# Patient Record
Sex: FEMALE | HISPANIC OR LATINO
[De-identification: names, ages, dates, MRNs, and addresses within clinical notes are randomized per-mention and may not be internally consistent; named-entity substitution may affect disease eponyms.]

---

## 2020-11-09 ENCOUNTER — APPOINTMENT (OUTPATIENT)
Dept: PEDIATRIC ALLERGY IMMUNOLOGY | Facility: CLINIC | Age: 55
End: 2020-11-09
Payer: MEDICARE

## 2020-11-09 VITALS
SYSTOLIC BLOOD PRESSURE: 120 MMHG | BODY MASS INDEX: 33.84 KG/M2 | HEIGHT: 63 IN | DIASTOLIC BLOOD PRESSURE: 70 MMHG | WEIGHT: 191 LBS

## 2020-11-09 PROCEDURE — 95004 PERQ TESTS W/ALRGNC XTRCS: CPT

## 2020-11-09 PROCEDURE — 95024 IQ TESTS W/ALLERGENIC XTRCS: CPT

## 2020-11-09 PROCEDURE — 99214 OFFICE O/P EST MOD 30 MIN: CPT | Mod: 25

## 2020-11-09 PROCEDURE — 99203 OFFICE O/P NEW LOW 30 MIN: CPT | Mod: 25

## 2020-11-09 RX ORDER — BUPROPION HYDROCHLORIDE 150 MG/1
150 TABLET, EXTENDED RELEASE ORAL
Refills: 0 | Status: ACTIVE | COMMUNITY

## 2020-11-09 RX ORDER — GABAPENTIN ENACARBIL 300 MG/1
300 TABLET, EXTENDED RELEASE ORAL
Refills: 0 | Status: ACTIVE | COMMUNITY

## 2020-11-09 RX ORDER — DICLOFENAC POTASSIUM 50 MG/1
50 POWDER, FOR SOLUTION ORAL
Refills: 0 | Status: ACTIVE | COMMUNITY

## 2020-11-09 RX ORDER — EPINEPHRINE 0.3 MG/.3ML
0.3 INJECTION INTRAMUSCULAR
Qty: 1 | Refills: 0 | Status: ACTIVE | COMMUNITY
Start: 2020-11-09 | End: 1900-01-01

## 2020-11-09 RX ORDER — CETIRIZINE HCL 10 MG
10 TABLET ORAL
Refills: 0 | Status: ACTIVE | COMMUNITY

## 2020-11-09 RX ORDER — ALBUTEROL SULFATE 90 UG/1
108 (90 BASE) AEROSOL, METERED RESPIRATORY (INHALATION)
Refills: 0 | Status: ACTIVE | COMMUNITY

## 2020-11-09 RX ORDER — CYCLOBENZAPRINE HYDROCHLORIDE 10 MG/1
10 TABLET, FILM COATED ORAL
Refills: 0 | Status: ACTIVE | COMMUNITY

## 2020-11-09 NOTE — SOCIAL HISTORY
[House] : [unfilled] lives in a house  [Radiator/Baseboard] : heating provided by radiator(s)/baseboard(s) [Window Units] : air conditioning provided by window units [Dry] : dry [Previous Pets ___] : previously owned pets [unfilled] [Single] : single [Smokers in Household] : there are smokers in the home [Dust Mite Covers] : does not have dust mite covers [Feather Pillows] : does not have feather pillows [Feather Comforter] : does not have a feather comforter [Bedroom] : not in the bedroom [Basement] : not in the basement [Living Area] : not in the living area

## 2020-11-09 NOTE — PHYSICAL EXAM
[Alert] : alert [Well Nourished] : well nourished [Healthy Appearance] : healthy appearance [No Acute Distress] : no acute distress [Well Developed] : well developed [Normal Pupil & Iris Size/Symmetry] : normal pupil and iris size and symmetry [No Discharge] : no discharge [No Photophobia] : no photophobia [Sclera Not Icteric] : sclera not icteric [Suborbital Bogginess] : suborbital bogginess (allergic shiners) [Normal TMs] : both tympanic membranes were normal [Normal Nasal Mucosa] : the nasal mucosa was normal [Normal Lips/Tongue] : the lips and tongue were normal [Normal Outer Ear/Nose] : the ears and nose were normal in appearance [Normal Tonsils] : normal tonsils [No Thrush] : no thrush [Normal Dentition] : normal dentition [No Oral Lesions or Ulcers] : no oral lesions or ulcers [Pale mucosa] : pale mucosa [Supple] : the neck was supple [Normal Rate and Effort] : normal respiratory rhythm and effort [Normal Palpation] : palpation of the chest revealed no abnormalities [No Crackles] : no crackles [No Retractions] : no retractions [Bilateral Audible Breath Sounds] : bilateral audible breath sounds [Normal Rate] : heart rate was normal  [Normal S1, S2] : normal S1 and S2 [No murmur] : no murmur [Regular Rhythm] : with a regular rhythm [Soft] : abdomen soft [Not Tender] : non-tender [Not Distended] : not distended [No HSM] : no hepato-splenomegaly [Normal Cervical Lymph Nodes] : cervical [Normal Axillary Lumph Nodes] : axillary [Skin Intact] : skin intact  [No Rash] : no rash [No Skin Lesions] : no skin lesions [No Joint Swelling or Erythema] : no joint swelling or erythema [No clubbing] : no clubbing [No Edema] : no edema [No Cyanosis] : no cyanosis [Normal Mood] : mood was normal [Normal Affect] : affect was normal [Alert, Awake, Oriented as Age-Appropriate] : alert, awake, oriented as age appropriate

## 2020-11-09 NOTE — ASSESSMENT
[FreeTextEntry1] : 1. AS - Albuterol PRN\par \par 2. AR/AC - Cetirizine PRN\par    Pt wishes to resume Immunotherapy. Will test and resume

## 2020-11-09 NOTE — HISTORY OF PRESENT ILLNESS
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.She has seasonal allergy symptoms and asthma. She is returning to continue immunotherapy treatment here.  [de-identified] : shellfish, raspberries, kiwi [Dyspnea on Exertion] : no dyspnea on exertion [Chest Pain] : no chest pain [Sputum Production] : non productive cough

## 2020-11-09 NOTE — REASON FOR VISIT
[Routine Follow-Up] : a routine follow-up visit for [Hay Fever] : hay fever [Itchy Eyes] : itchy eyes [Asthma] : asthma [Other: _____] : [unfilled]

## 2020-11-09 NOTE — IMPRESSION
[Allergy Testing Dust Mite] : dust mites [Allergy Testing Cockroach] : cockroach [Allergy Testing Dog] : dog [Allergy Testing Cat] : cat [] : molds [Allergy Testing Trees] : trees [Allergy Testing Weeds] : weeds [Allergy Testing Grasses] : grasses [FreeTextEntry3] : Weed, Ragweed, tree, DM, Mold

## 2020-11-17 ENCOUNTER — APPOINTMENT (OUTPATIENT)
Dept: PEDIATRIC ALLERGY IMMUNOLOGY | Facility: CLINIC | Age: 55
End: 2020-11-17

## 2020-11-30 ENCOUNTER — APPOINTMENT (OUTPATIENT)
Dept: PEDIATRIC ALLERGY IMMUNOLOGY | Facility: CLINIC | Age: 55
End: 2020-11-30
Payer: MEDICARE

## 2020-11-30 PROCEDURE — 95117 IMMUNOTHERAPY INJECTIONS: CPT

## 2020-12-08 ENCOUNTER — APPOINTMENT (OUTPATIENT)
Dept: PEDIATRIC ALLERGY IMMUNOLOGY | Facility: CLINIC | Age: 55
End: 2020-12-08
Payer: MEDICARE

## 2020-12-08 PROCEDURE — 95117 IMMUNOTHERAPY INJECTIONS: CPT

## 2020-12-15 ENCOUNTER — APPOINTMENT (OUTPATIENT)
Dept: PEDIATRIC ALLERGY IMMUNOLOGY | Facility: CLINIC | Age: 55
End: 2020-12-15
Payer: MEDICARE

## 2020-12-15 PROCEDURE — 95117 IMMUNOTHERAPY INJECTIONS: CPT

## 2020-12-22 ENCOUNTER — APPOINTMENT (OUTPATIENT)
Dept: PEDIATRIC ALLERGY IMMUNOLOGY | Facility: CLINIC | Age: 55
End: 2020-12-22
Payer: MEDICARE

## 2020-12-22 PROCEDURE — 95117 IMMUNOTHERAPY INJECTIONS: CPT

## 2020-12-29 ENCOUNTER — APPOINTMENT (OUTPATIENT)
Dept: PEDIATRIC ALLERGY IMMUNOLOGY | Facility: CLINIC | Age: 55
End: 2020-12-29
Payer: MEDICARE

## 2020-12-29 PROCEDURE — 95117 IMMUNOTHERAPY INJECTIONS: CPT

## 2021-01-05 ENCOUNTER — APPOINTMENT (OUTPATIENT)
Dept: PEDIATRIC ALLERGY IMMUNOLOGY | Facility: CLINIC | Age: 56
End: 2021-01-05
Payer: MEDICARE

## 2021-01-05 PROCEDURE — 95117 IMMUNOTHERAPY INJECTIONS: CPT

## 2021-01-14 ENCOUNTER — APPOINTMENT (OUTPATIENT)
Dept: PEDIATRIC ALLERGY IMMUNOLOGY | Facility: CLINIC | Age: 56
End: 2021-01-14
Payer: MEDICARE

## 2021-01-14 PROCEDURE — 95117 IMMUNOTHERAPY INJECTIONS: CPT

## 2021-01-26 ENCOUNTER — APPOINTMENT (OUTPATIENT)
Dept: PEDIATRIC ALLERGY IMMUNOLOGY | Facility: CLINIC | Age: 56
End: 2021-01-26
Payer: MEDICARE

## 2021-01-26 PROCEDURE — 95117 IMMUNOTHERAPY INJECTIONS: CPT

## 2021-02-02 ENCOUNTER — APPOINTMENT (OUTPATIENT)
Dept: PEDIATRIC ALLERGY IMMUNOLOGY | Facility: CLINIC | Age: 56
End: 2021-02-02

## 2021-02-04 ENCOUNTER — APPOINTMENT (OUTPATIENT)
Dept: PEDIATRIC ALLERGY IMMUNOLOGY | Facility: CLINIC | Age: 56
End: 2021-02-04
Payer: MEDICARE

## 2021-02-04 PROCEDURE — 95117 IMMUNOTHERAPY INJECTIONS: CPT

## 2021-02-11 ENCOUNTER — APPOINTMENT (OUTPATIENT)
Dept: PEDIATRIC ALLERGY IMMUNOLOGY | Facility: CLINIC | Age: 56
End: 2021-02-11
Payer: MEDICARE

## 2021-02-11 PROCEDURE — 95117 IMMUNOTHERAPY INJECTIONS: CPT

## 2021-02-16 ENCOUNTER — APPOINTMENT (OUTPATIENT)
Dept: PEDIATRIC ALLERGY IMMUNOLOGY | Facility: CLINIC | Age: 56
End: 2021-02-16

## 2021-02-16 ENCOUNTER — APPOINTMENT (OUTPATIENT)
Dept: PEDIATRIC ALLERGY IMMUNOLOGY | Facility: CLINIC | Age: 56
End: 2021-02-16
Payer: MEDICARE

## 2021-02-16 VITALS
SYSTOLIC BLOOD PRESSURE: 118 MMHG | BODY MASS INDEX: 33.31 KG/M2 | HEIGHT: 63 IN | TEMPERATURE: 97.1 F | DIASTOLIC BLOOD PRESSURE: 74 MMHG | WEIGHT: 188 LBS

## 2021-02-16 VITALS
DIASTOLIC BLOOD PRESSURE: 74 MMHG | WEIGHT: 188 LBS | BODY MASS INDEX: 33.31 KG/M2 | HEIGHT: 63 IN | TEMPERATURE: 97.1 F | SYSTOLIC BLOOD PRESSURE: 118 MMHG

## 2021-02-16 PROCEDURE — 95117 IMMUNOTHERAPY INJECTIONS: CPT

## 2021-02-16 PROCEDURE — 99213 OFFICE O/P EST LOW 20 MIN: CPT | Mod: 25

## 2021-02-16 NOTE — REVIEW OF SYSTEMS
[Chest Pain] : chest pain  or discomfort [Wheezing] : wheezing [Nl] : Genitourinary [Wheezing Worsens With Exercise] : wheezing does not worsen with exercise

## 2021-02-16 NOTE — REASON FOR VISIT
[Asthma] : asthma [Wheezing] : wheezing [Other: _____] : [unfilled] [FreeTextEntry2] : p [FreeTextEntry3] : asthma attacks twice this week

## 2021-02-16 NOTE — ASSESSMENT
[FreeTextEntry1] : 1. AS- montelukast, albuterol prn\par \par 2. AR/AC - Cetirizine prn\par \par 3. GERD - Pepcid prn

## 2021-02-16 NOTE — HISTORY OF PRESENT ILLNESS
[Shortness of Breath] : shortness of breath [Chest Pain] : chest pain [Wheezing] : wheezing  [de-identified] : TWICE THIS PAST WEEK [de-identified] : PAIN INJECTIONS [de-identified] : INHALER [de-identified] : TWICE THIS WEEK [de-identified] : CHEST TIGHTNESS AND WHEEZING [Cough] : no cough

## 2021-02-16 NOTE — HISTORY OF PRESENT ILLNESS
[Shortness of Breath] : shortness of breath [Chest Pain] : chest pain [Wheezing] : wheezing  [de-identified] : MORGAN BRUCE is a 55 year yo female w/ AS and AR/AC who had 2 episodes of bed time chest tightness 2 days apart She pain in the center of her chest. She did not take albuerol [de-identified] : twice this past week [de-identified] : pain injections and allergy injections the same day  [de-identified] : inhaler  [de-identified] : twice this past week  [de-identified] : chest tightness and wheezing

## 2021-02-23 ENCOUNTER — APPOINTMENT (OUTPATIENT)
Dept: PEDIATRIC ALLERGY IMMUNOLOGY | Facility: CLINIC | Age: 56
End: 2021-02-23
Payer: MEDICARE

## 2021-02-23 PROCEDURE — 95117 IMMUNOTHERAPY INJECTIONS: CPT

## 2021-03-02 ENCOUNTER — APPOINTMENT (OUTPATIENT)
Dept: PEDIATRIC ALLERGY IMMUNOLOGY | Facility: CLINIC | Age: 56
End: 2021-03-02
Payer: MEDICARE

## 2021-03-02 PROCEDURE — 95117 IMMUNOTHERAPY INJECTIONS: CPT

## 2021-03-09 ENCOUNTER — APPOINTMENT (OUTPATIENT)
Dept: PEDIATRIC ALLERGY IMMUNOLOGY | Facility: CLINIC | Age: 56
End: 2021-03-09
Payer: MEDICARE

## 2021-03-09 PROCEDURE — 95117 IMMUNOTHERAPY INJECTIONS: CPT

## 2021-03-16 ENCOUNTER — APPOINTMENT (OUTPATIENT)
Dept: PEDIATRIC ALLERGY IMMUNOLOGY | Facility: CLINIC | Age: 56
End: 2021-03-16
Payer: MEDICARE

## 2021-03-16 PROCEDURE — 95117 IMMUNOTHERAPY INJECTIONS: CPT

## 2021-03-23 ENCOUNTER — APPOINTMENT (OUTPATIENT)
Dept: PEDIATRIC ALLERGY IMMUNOLOGY | Facility: CLINIC | Age: 56
End: 2021-03-23
Payer: MEDICARE

## 2021-03-23 PROCEDURE — 95117 IMMUNOTHERAPY INJECTIONS: CPT

## 2021-03-30 ENCOUNTER — APPOINTMENT (OUTPATIENT)
Dept: PEDIATRIC ALLERGY IMMUNOLOGY | Facility: CLINIC | Age: 56
End: 2021-03-30
Payer: MEDICARE

## 2021-03-30 PROCEDURE — 95117 IMMUNOTHERAPY INJECTIONS: CPT

## 2021-04-13 ENCOUNTER — APPOINTMENT (OUTPATIENT)
Dept: PEDIATRIC ALLERGY IMMUNOLOGY | Facility: CLINIC | Age: 56
End: 2021-04-13
Payer: MEDICARE

## 2021-04-13 PROCEDURE — 95117 IMMUNOTHERAPY INJECTIONS: CPT

## 2021-05-11 ENCOUNTER — APPOINTMENT (OUTPATIENT)
Dept: PEDIATRIC ALLERGY IMMUNOLOGY | Facility: CLINIC | Age: 56
End: 2021-05-11
Payer: MEDICARE

## 2021-05-11 PROCEDURE — 95117 IMMUNOTHERAPY INJECTIONS: CPT

## 2021-05-18 ENCOUNTER — APPOINTMENT (OUTPATIENT)
Dept: PEDIATRIC ALLERGY IMMUNOLOGY | Facility: CLINIC | Age: 56
End: 2021-05-18
Payer: MEDICARE

## 2021-05-18 PROCEDURE — 95117 IMMUNOTHERAPY INJECTIONS: CPT

## 2021-05-27 ENCOUNTER — APPOINTMENT (OUTPATIENT)
Dept: PEDIATRIC ALLERGY IMMUNOLOGY | Facility: CLINIC | Age: 56
End: 2021-05-27
Payer: MEDICARE

## 2021-05-27 PROCEDURE — 95117 IMMUNOTHERAPY INJECTIONS: CPT

## 2021-06-10 ENCOUNTER — APPOINTMENT (OUTPATIENT)
Dept: PEDIATRIC ALLERGY IMMUNOLOGY | Facility: CLINIC | Age: 56
End: 2021-06-10
Payer: MEDICARE

## 2021-06-10 PROCEDURE — 95117 IMMUNOTHERAPY INJECTIONS: CPT

## 2021-06-17 ENCOUNTER — APPOINTMENT (OUTPATIENT)
Dept: PEDIATRIC ALLERGY IMMUNOLOGY | Facility: CLINIC | Age: 56
End: 2021-06-17
Payer: MEDICARE

## 2021-06-17 PROCEDURE — 95117 IMMUNOTHERAPY INJECTIONS: CPT

## 2021-06-22 ENCOUNTER — APPOINTMENT (OUTPATIENT)
Dept: PEDIATRIC ALLERGY IMMUNOLOGY | Facility: CLINIC | Age: 56
End: 2021-06-22
Payer: MEDICARE

## 2021-06-22 PROCEDURE — 95117 IMMUNOTHERAPY INJECTIONS: CPT

## 2021-06-25 ENCOUNTER — APPOINTMENT (OUTPATIENT)
Dept: PEDIATRIC ALLERGY IMMUNOLOGY | Facility: CLINIC | Age: 56
End: 2021-06-25
Payer: MEDICARE

## 2021-06-25 PROCEDURE — 95165 ANTIGEN THERAPY SERVICES: CPT

## 2021-07-01 ENCOUNTER — APPOINTMENT (OUTPATIENT)
Dept: PEDIATRIC ALLERGY IMMUNOLOGY | Facility: CLINIC | Age: 56
End: 2021-07-01
Payer: MEDICARE

## 2021-07-01 PROCEDURE — 95117 IMMUNOTHERAPY INJECTIONS: CPT

## 2021-07-08 ENCOUNTER — APPOINTMENT (OUTPATIENT)
Dept: PEDIATRIC ALLERGY IMMUNOLOGY | Facility: CLINIC | Age: 56
End: 2021-07-08
Payer: MEDICARE

## 2021-07-08 PROCEDURE — 95117 IMMUNOTHERAPY INJECTIONS: CPT

## 2021-07-15 ENCOUNTER — APPOINTMENT (OUTPATIENT)
Dept: PEDIATRIC ALLERGY IMMUNOLOGY | Facility: CLINIC | Age: 56
End: 2021-07-15
Payer: MEDICARE

## 2021-07-15 PROCEDURE — 95117 IMMUNOTHERAPY INJECTIONS: CPT

## 2021-08-24 ENCOUNTER — APPOINTMENT (OUTPATIENT)
Dept: PEDIATRIC ALLERGY IMMUNOLOGY | Facility: CLINIC | Age: 56
End: 2021-08-24
Payer: MEDICARE

## 2021-08-24 PROCEDURE — 95117 IMMUNOTHERAPY INJECTIONS: CPT

## 2021-09-02 ENCOUNTER — APPOINTMENT (OUTPATIENT)
Dept: PEDIATRIC ALLERGY IMMUNOLOGY | Facility: CLINIC | Age: 56
End: 2021-09-02
Payer: MEDICARE

## 2021-09-02 PROCEDURE — 95117 IMMUNOTHERAPY INJECTIONS: CPT

## 2021-09-02 RX ORDER — AZELASTINE HYDROCHLORIDE 0.5 MG/ML
0.05 SOLUTION/ DROPS OPHTHALMIC TWICE DAILY
Qty: 3 | Refills: 0 | Status: ACTIVE | COMMUNITY
Start: 2021-09-02 | End: 1900-01-01

## 2021-09-03 ENCOUNTER — APPOINTMENT (OUTPATIENT)
Dept: PEDIATRIC ALLERGY IMMUNOLOGY | Facility: CLINIC | Age: 56
End: 2021-09-03
Payer: MEDICARE

## 2021-09-03 PROCEDURE — 95165 ANTIGEN THERAPY SERVICES: CPT

## 2021-09-07 ENCOUNTER — APPOINTMENT (OUTPATIENT)
Dept: PEDIATRIC ALLERGY IMMUNOLOGY | Facility: CLINIC | Age: 56
End: 2021-09-07
Payer: MEDICARE

## 2021-09-07 PROCEDURE — 95117 IMMUNOTHERAPY INJECTIONS: CPT

## 2021-09-16 ENCOUNTER — APPOINTMENT (OUTPATIENT)
Dept: PEDIATRIC ALLERGY IMMUNOLOGY | Facility: CLINIC | Age: 56
End: 2021-09-16
Payer: MEDICARE

## 2021-09-16 PROCEDURE — 95117 IMMUNOTHERAPY INJECTIONS: CPT

## 2021-09-21 ENCOUNTER — APPOINTMENT (OUTPATIENT)
Dept: PEDIATRIC ALLERGY IMMUNOLOGY | Facility: CLINIC | Age: 56
End: 2021-09-21
Payer: MEDICARE

## 2021-09-21 PROCEDURE — 95117 IMMUNOTHERAPY INJECTIONS: CPT

## 2021-09-30 ENCOUNTER — APPOINTMENT (OUTPATIENT)
Dept: PEDIATRIC ALLERGY IMMUNOLOGY | Facility: CLINIC | Age: 56
End: 2021-09-30
Payer: MEDICARE

## 2021-09-30 PROCEDURE — 95117 IMMUNOTHERAPY INJECTIONS: CPT

## 2021-10-12 ENCOUNTER — APPOINTMENT (OUTPATIENT)
Dept: PEDIATRIC ALLERGY IMMUNOLOGY | Facility: CLINIC | Age: 56
End: 2021-10-12
Payer: MEDICARE

## 2021-10-12 PROCEDURE — 95117 IMMUNOTHERAPY INJECTIONS: CPT

## 2021-10-21 ENCOUNTER — APPOINTMENT (OUTPATIENT)
Dept: PEDIATRIC ALLERGY IMMUNOLOGY | Facility: CLINIC | Age: 56
End: 2021-10-21
Payer: MEDICARE

## 2021-10-21 PROCEDURE — 95117 IMMUNOTHERAPY INJECTIONS: CPT

## 2021-10-28 ENCOUNTER — APPOINTMENT (OUTPATIENT)
Dept: PEDIATRIC ALLERGY IMMUNOLOGY | Facility: CLINIC | Age: 56
End: 2021-10-28
Payer: MEDICARE

## 2021-10-28 PROCEDURE — 95117 IMMUNOTHERAPY INJECTIONS: CPT

## 2021-11-04 ENCOUNTER — APPOINTMENT (OUTPATIENT)
Dept: PEDIATRIC ALLERGY IMMUNOLOGY | Facility: CLINIC | Age: 56
End: 2021-11-04
Payer: MEDICARE

## 2021-11-04 PROCEDURE — 95117 IMMUNOTHERAPY INJECTIONS: CPT

## 2021-11-12 ENCOUNTER — APPOINTMENT (OUTPATIENT)
Dept: PEDIATRIC ALLERGY IMMUNOLOGY | Facility: CLINIC | Age: 56
End: 2021-11-12
Payer: MEDICARE

## 2021-11-12 PROCEDURE — 95165 ANTIGEN THERAPY SERVICES: CPT

## 2021-11-16 ENCOUNTER — APPOINTMENT (OUTPATIENT)
Dept: PEDIATRIC ALLERGY IMMUNOLOGY | Facility: CLINIC | Age: 56
End: 2021-11-16
Payer: MEDICARE

## 2021-11-16 PROCEDURE — 95117 IMMUNOTHERAPY INJECTIONS: CPT

## 2021-11-23 ENCOUNTER — APPOINTMENT (OUTPATIENT)
Dept: PEDIATRIC ALLERGY IMMUNOLOGY | Facility: CLINIC | Age: 56
End: 2021-11-23
Payer: MEDICARE

## 2021-11-23 PROCEDURE — 95117 IMMUNOTHERAPY INJECTIONS: CPT

## 2021-11-29 ENCOUNTER — APPOINTMENT (OUTPATIENT)
Dept: PEDIATRIC ALLERGY IMMUNOLOGY | Facility: CLINIC | Age: 56
End: 2021-11-29
Payer: MEDICARE

## 2021-11-29 PROCEDURE — 95117 IMMUNOTHERAPY INJECTIONS: CPT

## 2021-11-29 RX ORDER — GENTAMICIN SULFATE 3 MG/ML
0.3 SOLUTION OPHTHALMIC
Qty: 1 | Refills: 1 | Status: ACTIVE | COMMUNITY
Start: 2021-11-29 | End: 1900-01-01

## 2021-12-09 ENCOUNTER — APPOINTMENT (OUTPATIENT)
Dept: PEDIATRIC ALLERGY IMMUNOLOGY | Facility: CLINIC | Age: 56
End: 2021-12-09
Payer: MEDICARE

## 2021-12-09 PROCEDURE — 95117 IMMUNOTHERAPY INJECTIONS: CPT

## 2021-12-14 ENCOUNTER — APPOINTMENT (OUTPATIENT)
Dept: PEDIATRIC ALLERGY IMMUNOLOGY | Facility: CLINIC | Age: 56
End: 2021-12-14
Payer: MEDICARE

## 2021-12-14 PROCEDURE — 95117 IMMUNOTHERAPY INJECTIONS: CPT

## 2022-01-04 ENCOUNTER — APPOINTMENT (OUTPATIENT)
Dept: PEDIATRIC ALLERGY IMMUNOLOGY | Facility: CLINIC | Age: 57
End: 2022-01-04
Payer: MEDICARE

## 2022-01-04 PROCEDURE — 95117 IMMUNOTHERAPY INJECTIONS: CPT

## 2022-01-04 RX ORDER — FLUTICASONE PROPIONATE 50 UG/1
50 SPRAY, METERED NASAL DAILY
Qty: 1 | Refills: 2 | Status: ACTIVE | COMMUNITY
Start: 2022-01-04 | End: 1900-01-01

## 2022-01-10 ENCOUNTER — APPOINTMENT (OUTPATIENT)
Dept: PEDIATRIC ALLERGY IMMUNOLOGY | Facility: CLINIC | Age: 57
End: 2022-01-10
Payer: MEDICARE

## 2022-01-10 PROCEDURE — 95117 IMMUNOTHERAPY INJECTIONS: CPT

## 2022-01-18 ENCOUNTER — APPOINTMENT (OUTPATIENT)
Dept: PEDIATRIC ALLERGY IMMUNOLOGY | Facility: CLINIC | Age: 57
End: 2022-01-18
Payer: MEDICARE

## 2022-01-18 PROCEDURE — 95117 IMMUNOTHERAPY INJECTIONS: CPT

## 2022-01-21 ENCOUNTER — APPOINTMENT (OUTPATIENT)
Dept: PEDIATRIC ALLERGY IMMUNOLOGY | Facility: CLINIC | Age: 57
End: 2022-01-21
Payer: MEDICARE

## 2022-01-21 PROCEDURE — 95165 ANTIGEN THERAPY SERVICES: CPT

## 2022-01-25 ENCOUNTER — APPOINTMENT (OUTPATIENT)
Dept: PEDIATRIC ALLERGY IMMUNOLOGY | Facility: CLINIC | Age: 57
End: 2022-01-25
Payer: MEDICARE

## 2022-01-25 PROCEDURE — 95117 IMMUNOTHERAPY INJECTIONS: CPT

## 2022-02-03 ENCOUNTER — APPOINTMENT (OUTPATIENT)
Dept: PEDIATRIC ALLERGY IMMUNOLOGY | Facility: CLINIC | Age: 57
End: 2022-02-03
Payer: MEDICARE

## 2022-02-03 PROCEDURE — 95117 IMMUNOTHERAPY INJECTIONS: CPT

## 2022-02-10 ENCOUNTER — APPOINTMENT (OUTPATIENT)
Dept: PEDIATRIC ALLERGY IMMUNOLOGY | Facility: CLINIC | Age: 57
End: 2022-02-10
Payer: MEDICARE

## 2022-02-10 PROCEDURE — 95117 IMMUNOTHERAPY INJECTIONS: CPT

## 2022-02-15 ENCOUNTER — APPOINTMENT (OUTPATIENT)
Dept: PEDIATRIC ALLERGY IMMUNOLOGY | Facility: CLINIC | Age: 57
End: 2022-02-15
Payer: MEDICARE

## 2022-02-15 PROCEDURE — 95117 IMMUNOTHERAPY INJECTIONS: CPT

## 2022-02-22 ENCOUNTER — APPOINTMENT (OUTPATIENT)
Dept: PEDIATRIC ALLERGY IMMUNOLOGY | Facility: CLINIC | Age: 57
End: 2022-02-22
Payer: MEDICARE

## 2022-02-22 VITALS
DIASTOLIC BLOOD PRESSURE: 74 MMHG | SYSTOLIC BLOOD PRESSURE: 128 MMHG | WEIGHT: 188 LBS | TEMPERATURE: 97.9 F | BODY MASS INDEX: 33.31 KG/M2 | HEIGHT: 63 IN

## 2022-02-22 DIAGNOSIS — J01.90 ACUTE SINUSITIS, UNSPECIFIED: ICD-10-CM

## 2022-02-22 PROCEDURE — 99213 OFFICE O/P EST LOW 20 MIN: CPT

## 2022-02-22 RX ORDER — METHYLPREDNISOLONE 4 MG/1
4 TABLET ORAL
Qty: 1 | Refills: 0 | Status: ACTIVE | COMMUNITY
Start: 2022-02-22 | End: 1900-01-01

## 2022-02-22 NOTE — HISTORY OF PRESENT ILLNESS
[de-identified] : MORGAN BRUCE is a 56 year female  with a history of nasal congestion, hard to breath and sinus headache that has been going on since last week. Pt states it has been getting worse in the night time and is now affecting her sleep. She was taking nasal decongestant and Flonase with no improvement. She denies any fever or cough,

## 2022-02-22 NOTE — ASSESSMENT
[FreeTextEntry1] : 1. AS- montelukast, albuterol prn\par \par 2. AR/AC - Cetirizine prn\par \par 3. GERD - Pepcid prn\par \par 4. Sinusitis - Methylprednisolone taper

## 2022-02-22 NOTE — PHYSICAL EXAM
[Alert] : alert [Well Nourished] : well nourished [Healthy Appearance] : healthy appearance [No Acute Distress] : no acute distress [Well Developed] : well developed [Normal Pupil & Iris Size/Symmetry] : normal pupil and iris size and symmetry [No Discharge] : no discharge [No Photophobia] : no photophobia [Sclera Not Icteric] : sclera not icteric [Normal TMs] : both tympanic membranes were normal [Normal Nasal Mucosa] : the nasal mucosa was normal [Normal Lips/Tongue] : the lips and tongue were normal [Normal Outer Ear/Nose] : the ears and nose were normal in appearance [Normal Tonsils] : normal tonsils [No Thrush] : no thrush [Pale mucosa] : no pale mucosa [Supple] : the neck was supple [Normal Rate and Effort] : normal respiratory rhythm and effort [No Crackles] : no crackles [No Retractions] : no retractions [Bilateral Audible Breath Sounds] : bilateral audible breath sounds [Normal Rate] : heart rate was normal  [Normal S1, S2] : normal S1 and S2 [No murmur] : no murmur [Regular Rhythm] : with a regular rhythm [Skin Intact] : skin intact  [No Rash] : no rash [No Skin Lesions] : no skin lesions [No clubbing] : no clubbing [No Edema] : no edema [No Cyanosis] : no cyanosis [Normal Mood] : mood was normal [Normal Affect] : affect was normal [Alert, Awake, Oriented as Age-Appropriate] : alert, awake, oriented as age appropriate

## 2022-02-22 NOTE — REVIEW OF SYSTEMS
[Nasal Congestion] : nasal congestion [Chest Pain] : chest pain  or discomfort [Difficulty Breathing] : dyspnea [Wheezing] : wheezing [Sleep Disturbances] : ~T sleep disturbances [Nl] : Genitourinary

## 2022-03-03 ENCOUNTER — APPOINTMENT (OUTPATIENT)
Dept: PEDIATRIC ALLERGY IMMUNOLOGY | Facility: CLINIC | Age: 57
End: 2022-03-03
Payer: MEDICARE

## 2022-03-03 PROCEDURE — 95117 IMMUNOTHERAPY INJECTIONS: CPT

## 2022-03-04 RX ORDER — ALBUTEROL SULFATE 90 UG/1
108 (90 BASE) INHALANT RESPIRATORY (INHALATION)
Qty: 1 | Refills: 0 | Status: ACTIVE | COMMUNITY
Start: 2022-03-04 | End: 1900-01-01

## 2022-03-07 ENCOUNTER — APPOINTMENT (OUTPATIENT)
Dept: PEDIATRIC ALLERGY IMMUNOLOGY | Facility: CLINIC | Age: 57
End: 2022-03-07
Payer: MEDICARE

## 2022-03-07 PROCEDURE — 95117 IMMUNOTHERAPY INJECTIONS: CPT

## 2022-03-07 RX ORDER — FEXOFENADINE HYDROCHLORIDE 180 MG/1
180 TABLET ORAL
Qty: 30 | Refills: 1 | Status: ACTIVE | COMMUNITY
Start: 2022-03-07 | End: 1900-01-01

## 2022-03-17 ENCOUNTER — APPOINTMENT (OUTPATIENT)
Dept: PEDIATRIC ALLERGY IMMUNOLOGY | Facility: CLINIC | Age: 57
End: 2022-03-17
Payer: MEDICARE

## 2022-03-17 PROCEDURE — 95117 IMMUNOTHERAPY INJECTIONS: CPT

## 2022-03-21 ENCOUNTER — APPOINTMENT (OUTPATIENT)
Dept: PEDIATRIC ALLERGY IMMUNOLOGY | Facility: CLINIC | Age: 57
End: 2022-03-21
Payer: MEDICARE

## 2022-03-21 PROCEDURE — 95117 IMMUNOTHERAPY INJECTIONS: CPT

## 2022-03-28 ENCOUNTER — APPOINTMENT (OUTPATIENT)
Dept: PEDIATRIC ALLERGY IMMUNOLOGY | Facility: CLINIC | Age: 57
End: 2022-03-28
Payer: MEDICARE

## 2022-03-28 PROCEDURE — 95117 IMMUNOTHERAPY INJECTIONS: CPT

## 2022-04-01 ENCOUNTER — APPOINTMENT (OUTPATIENT)
Dept: PEDIATRIC ALLERGY IMMUNOLOGY | Facility: CLINIC | Age: 57
End: 2022-04-01
Payer: MEDICARE

## 2022-04-01 PROCEDURE — 95165 ANTIGEN THERAPY SERVICES: CPT

## 2022-04-07 ENCOUNTER — APPOINTMENT (OUTPATIENT)
Dept: PEDIATRIC ALLERGY IMMUNOLOGY | Facility: CLINIC | Age: 57
End: 2022-04-07
Payer: MEDICARE

## 2022-04-07 PROCEDURE — 95117 IMMUNOTHERAPY INJECTIONS: CPT

## 2022-04-11 ENCOUNTER — APPOINTMENT (OUTPATIENT)
Dept: PEDIATRIC ALLERGY IMMUNOLOGY | Facility: CLINIC | Age: 57
End: 2022-04-11
Payer: MEDICARE

## 2022-04-11 PROCEDURE — 95117 IMMUNOTHERAPY INJECTIONS: CPT

## 2022-04-14 ENCOUNTER — RX RENEWAL (OUTPATIENT)
Age: 57
End: 2022-04-14

## 2022-04-14 RX ORDER — EPINEPHRINE 0.3 MG/.3ML
0.3 INJECTION INTRAMUSCULAR
Qty: 2 | Refills: 0 | Status: ACTIVE | COMMUNITY
Start: 2022-03-04 | End: 1900-01-01

## 2022-04-21 ENCOUNTER — APPOINTMENT (OUTPATIENT)
Dept: PEDIATRIC ALLERGY IMMUNOLOGY | Facility: CLINIC | Age: 57
End: 2022-04-21
Payer: MEDICARE

## 2022-04-21 PROCEDURE — 95117 IMMUNOTHERAPY INJECTIONS: CPT

## 2022-04-26 ENCOUNTER — APPOINTMENT (OUTPATIENT)
Dept: PEDIATRIC ALLERGY IMMUNOLOGY | Facility: CLINIC | Age: 57
End: 2022-04-26
Payer: MEDICARE

## 2022-04-26 PROCEDURE — 95117 IMMUNOTHERAPY INJECTIONS: CPT

## 2022-05-05 ENCOUNTER — APPOINTMENT (OUTPATIENT)
Dept: PEDIATRIC ALLERGY IMMUNOLOGY | Facility: CLINIC | Age: 57
End: 2022-05-05
Payer: MEDICARE

## 2022-05-05 PROCEDURE — 95117 IMMUNOTHERAPY INJECTIONS: CPT

## 2022-05-10 ENCOUNTER — APPOINTMENT (OUTPATIENT)
Dept: PEDIATRIC ALLERGY IMMUNOLOGY | Facility: CLINIC | Age: 57
End: 2022-05-10
Payer: MEDICARE

## 2022-05-10 PROCEDURE — 95117 IMMUNOTHERAPY INJECTIONS: CPT

## 2022-05-19 ENCOUNTER — APPOINTMENT (OUTPATIENT)
Dept: PEDIATRIC ALLERGY IMMUNOLOGY | Facility: CLINIC | Age: 57
End: 2022-05-19
Payer: MEDICARE

## 2022-05-19 PROCEDURE — 95117 IMMUNOTHERAPY INJECTIONS: CPT

## 2022-05-24 ENCOUNTER — APPOINTMENT (OUTPATIENT)
Dept: PEDIATRIC ALLERGY IMMUNOLOGY | Facility: CLINIC | Age: 57
End: 2022-05-24
Payer: MEDICARE

## 2022-05-24 PROCEDURE — 95117 IMMUNOTHERAPY INJECTIONS: CPT

## 2022-05-31 ENCOUNTER — APPOINTMENT (OUTPATIENT)
Dept: PEDIATRIC ALLERGY IMMUNOLOGY | Facility: CLINIC | Age: 57
End: 2022-05-31
Payer: MEDICARE

## 2022-05-31 PROCEDURE — 95117 IMMUNOTHERAPY INJECTIONS: CPT

## 2022-06-07 ENCOUNTER — APPOINTMENT (OUTPATIENT)
Dept: PEDIATRIC ALLERGY IMMUNOLOGY | Facility: CLINIC | Age: 57
End: 2022-06-07
Payer: MEDICARE

## 2022-06-07 PROCEDURE — 95117 IMMUNOTHERAPY INJECTIONS: CPT

## 2022-06-10 ENCOUNTER — APPOINTMENT (OUTPATIENT)
Dept: PEDIATRIC ALLERGY IMMUNOLOGY | Facility: CLINIC | Age: 57
End: 2022-06-10
Payer: MEDICARE

## 2022-06-10 PROCEDURE — 95165 ANTIGEN THERAPY SERVICES: CPT

## 2022-06-13 ENCOUNTER — APPOINTMENT (OUTPATIENT)
Dept: PEDIATRIC ALLERGY IMMUNOLOGY | Facility: CLINIC | Age: 57
End: 2022-06-13
Payer: MEDICARE

## 2022-06-13 PROCEDURE — 95117 IMMUNOTHERAPY INJECTIONS: CPT

## 2022-06-21 ENCOUNTER — APPOINTMENT (OUTPATIENT)
Dept: PEDIATRIC ALLERGY IMMUNOLOGY | Facility: CLINIC | Age: 57
End: 2022-06-21
Payer: MEDICARE

## 2022-06-21 PROCEDURE — 95117 IMMUNOTHERAPY INJECTIONS: CPT

## 2022-06-28 ENCOUNTER — APPOINTMENT (OUTPATIENT)
Dept: PEDIATRIC ALLERGY IMMUNOLOGY | Facility: CLINIC | Age: 57
End: 2022-06-28

## 2022-06-28 PROCEDURE — 95117 IMMUNOTHERAPY INJECTIONS: CPT

## 2022-07-05 ENCOUNTER — APPOINTMENT (OUTPATIENT)
Dept: PEDIATRIC ALLERGY IMMUNOLOGY | Facility: CLINIC | Age: 57
End: 2022-07-05

## 2022-07-05 PROCEDURE — 95117 IMMUNOTHERAPY INJECTIONS: CPT

## 2022-07-14 ENCOUNTER — APPOINTMENT (OUTPATIENT)
Dept: PEDIATRIC ALLERGY IMMUNOLOGY | Facility: CLINIC | Age: 57
End: 2022-07-14

## 2022-07-14 PROCEDURE — 95117 IMMUNOTHERAPY INJECTIONS: CPT

## 2022-07-19 ENCOUNTER — APPOINTMENT (OUTPATIENT)
Dept: PEDIATRIC ALLERGY IMMUNOLOGY | Facility: CLINIC | Age: 57
End: 2022-07-19

## 2022-07-19 PROCEDURE — 95117 IMMUNOTHERAPY INJECTIONS: CPT

## 2022-07-26 ENCOUNTER — APPOINTMENT (OUTPATIENT)
Dept: PEDIATRIC ALLERGY IMMUNOLOGY | Facility: CLINIC | Age: 57
End: 2022-07-26

## 2022-07-26 PROCEDURE — 95117 IMMUNOTHERAPY INJECTIONS: CPT

## 2022-08-04 ENCOUNTER — APPOINTMENT (OUTPATIENT)
Dept: PEDIATRIC ALLERGY IMMUNOLOGY | Facility: CLINIC | Age: 57
End: 2022-08-04

## 2022-08-04 PROCEDURE — 95117 IMMUNOTHERAPY INJECTIONS: CPT

## 2022-08-09 ENCOUNTER — APPOINTMENT (OUTPATIENT)
Dept: PEDIATRIC ALLERGY IMMUNOLOGY | Facility: CLINIC | Age: 57
End: 2022-08-09

## 2022-08-09 PROCEDURE — 95117 IMMUNOTHERAPY INJECTIONS: CPT

## 2022-08-16 ENCOUNTER — APPOINTMENT (OUTPATIENT)
Dept: PEDIATRIC ALLERGY IMMUNOLOGY | Facility: CLINIC | Age: 57
End: 2022-08-16

## 2022-08-16 PROCEDURE — 95117 IMMUNOTHERAPY INJECTIONS: CPT

## 2022-08-19 ENCOUNTER — APPOINTMENT (OUTPATIENT)
Dept: PEDIATRIC ALLERGY IMMUNOLOGY | Facility: CLINIC | Age: 57
End: 2022-08-19

## 2022-08-19 PROCEDURE — 95165 ANTIGEN THERAPY SERVICES: CPT

## 2022-08-23 ENCOUNTER — APPOINTMENT (OUTPATIENT)
Dept: PEDIATRIC ALLERGY IMMUNOLOGY | Facility: CLINIC | Age: 57
End: 2022-08-23

## 2022-08-23 DIAGNOSIS — Z00.00 ENCOUNTER FOR GENERAL ADULT MEDICAL EXAMINATION W/OUT ABNORMAL FINDINGS: ICD-10-CM

## 2022-08-23 PROCEDURE — 95117 IMMUNOTHERAPY INJECTIONS: CPT

## 2022-08-30 ENCOUNTER — APPOINTMENT (OUTPATIENT)
Dept: PEDIATRIC ALLERGY IMMUNOLOGY | Facility: CLINIC | Age: 57
End: 2022-08-30

## 2022-08-30 PROCEDURE — 95117 IMMUNOTHERAPY INJECTIONS: CPT

## 2022-09-06 ENCOUNTER — APPOINTMENT (OUTPATIENT)
Dept: PEDIATRIC ALLERGY IMMUNOLOGY | Facility: CLINIC | Age: 57
End: 2022-09-06

## 2022-09-06 PROCEDURE — 95117 IMMUNOTHERAPY INJECTIONS: CPT

## 2022-09-13 ENCOUNTER — APPOINTMENT (OUTPATIENT)
Dept: PEDIATRIC ALLERGY IMMUNOLOGY | Facility: CLINIC | Age: 57
End: 2022-09-13

## 2022-09-13 VITALS — WEIGHT: 179 LBS | BODY MASS INDEX: 31.71 KG/M2 | HEIGHT: 63 IN

## 2022-09-13 PROCEDURE — 99213 OFFICE O/P EST LOW 20 MIN: CPT | Mod: 25

## 2022-09-13 PROCEDURE — 95117 IMMUNOTHERAPY INJECTIONS: CPT

## 2022-09-13 RX ORDER — MONTELUKAST 10 MG/1
10 TABLET, FILM COATED ORAL
Qty: 30 | Refills: 0 | Status: ACTIVE | COMMUNITY
Start: 2022-09-13 | End: 1900-01-01

## 2022-09-13 NOTE — PHYSICAL EXAM
[Alert] : alert [Well Nourished] : well nourished [Healthy Appearance] : healthy appearance [No Acute Distress] : no acute distress [Well Developed] : well developed [Normal Pupil & Iris Size/Symmetry] : normal pupil and iris size and symmetry [No Discharge] : no discharge [No Photophobia] : no photophobia [Sclera Not Icteric] : sclera not icteric [Normal TMs] : both tympanic membranes were normal [Normal Nasal Mucosa] : the nasal mucosa was normal [Normal Lips/Tongue] : the lips and tongue were normal [Normal Outer Ear/Nose] : the ears and nose were normal in appearance [Normal Tonsils] : normal tonsils [No Thrush] : no thrush [Supple] : the neck was supple [Normal Rate and Effort] : normal respiratory rhythm and effort [No Crackles] : no crackles [No Retractions] : no retractions [Bilateral Audible Breath Sounds] : bilateral audible breath sounds [Skin Intact] : skin intact  [No Rash] : no rash [No Skin Lesions] : no skin lesions [No clubbing] : no clubbing [No Edema] : no edema [No Cyanosis] : no cyanosis [Normal Mood] : mood was normal [Normal Affect] : affect was normal [Alert, Awake, Oriented as Age-Appropriate] : alert, awake, oriented as age appropriate [Pale mucosa] : no pale mucosa

## 2022-09-13 NOTE — HISTORY OF PRESENT ILLNESS
[None] : The patient is currently asymptomatic [de-identified] : MORGAN BRUCE is a 57 year female who has been presenting with rashes and itchy skin. Patient states it has been going on since the summer. Patient states she had a rash (red bumps) last week and she also feels itching sensation all over her skin (hives or redness) that happens through out the day. She takes Allegra 180 mg and Cetirizine 10 mg as needed.  Patient states she has been feeling acid reflux which she has been taking Pepcid with improvement. Patient states her asthma has been under good control, she takes albuterol inhaler as needed. \par \par She feels the sensation of itchiness,

## 2022-09-13 NOTE — ASSESSMENT
[FreeTextEntry1] : 1. AS- montelukast 10mg, albuterol prn\par \par 2. AR/AC - Fexofenadine 180mg Cetirizine 10mg\par \par 3. GERD - Pepcid prn\par \par

## 2022-09-20 ENCOUNTER — APPOINTMENT (OUTPATIENT)
Dept: PEDIATRIC ALLERGY IMMUNOLOGY | Facility: CLINIC | Age: 57
End: 2022-09-20

## 2022-09-20 PROCEDURE — 95117 IMMUNOTHERAPY INJECTIONS: CPT

## 2022-09-26 ENCOUNTER — APPOINTMENT (OUTPATIENT)
Dept: PEDIATRIC ALLERGY IMMUNOLOGY | Facility: CLINIC | Age: 57
End: 2022-09-26

## 2022-09-26 PROCEDURE — 95117 IMMUNOTHERAPY INJECTIONS: CPT

## 2022-10-04 ENCOUNTER — APPOINTMENT (OUTPATIENT)
Dept: PEDIATRIC ALLERGY IMMUNOLOGY | Facility: CLINIC | Age: 57
End: 2022-10-04

## 2022-10-04 PROCEDURE — 95117 IMMUNOTHERAPY INJECTIONS: CPT

## 2022-10-10 ENCOUNTER — APPOINTMENT (OUTPATIENT)
Dept: PEDIATRIC ALLERGY IMMUNOLOGY | Facility: CLINIC | Age: 57
End: 2022-10-10

## 2022-10-10 PROCEDURE — 95117 IMMUNOTHERAPY INJECTIONS: CPT

## 2022-10-17 ENCOUNTER — APPOINTMENT (OUTPATIENT)
Dept: PEDIATRIC ALLERGY IMMUNOLOGY | Facility: CLINIC | Age: 57
End: 2022-10-17

## 2022-10-17 DIAGNOSIS — M94.0 CHONDROCOSTAL JUNCTION SYNDROME [TIETZE]: ICD-10-CM

## 2022-10-17 PROCEDURE — 95117 IMMUNOTHERAPY INJECTIONS: CPT

## 2022-10-17 PROCEDURE — 99213 OFFICE O/P EST LOW 20 MIN: CPT | Mod: 25

## 2022-10-17 NOTE — HISTORY OF PRESENT ILLNESS
[None] : The patient is currently asymptomatic [de-identified] : MORGAN BRUCE is a 57 year  old female currently on allergen immunotherapy which she is tolerating well. She presents today with chest pain  on the anterior part of her chest and dry cough for the past 10 days. She saw her cardiologist who did not find anything significant. She was okay for a couple of days and became symptomatic again.

## 2022-10-17 NOTE — ASSESSMENT
[FreeTextEntry1] : The patient is a 57 year old female of Chronic asthma and Allergic rhinitis. My impression is she may have Costochondritis. I have advised her to consult with her primary care physician for possible treatment.

## 2022-10-17 NOTE — PHYSICAL EXAM

## 2022-10-17 NOTE — IMPRESSION
[Pulmonary Function Test] : Pulmonary Function Test [FreeTextEntry1] : large and small airway obstruction. Mild to moderate

## 2022-10-25 ENCOUNTER — APPOINTMENT (OUTPATIENT)
Dept: PEDIATRIC ALLERGY IMMUNOLOGY | Facility: CLINIC | Age: 57
End: 2022-10-25

## 2022-10-25 PROCEDURE — 95117 IMMUNOTHERAPY INJECTIONS: CPT

## 2022-10-28 ENCOUNTER — APPOINTMENT (OUTPATIENT)
Dept: PEDIATRIC ALLERGY IMMUNOLOGY | Facility: CLINIC | Age: 57
End: 2022-10-28

## 2022-10-28 PROCEDURE — 95165 ANTIGEN THERAPY SERVICES: CPT

## 2022-10-31 ENCOUNTER — APPOINTMENT (OUTPATIENT)
Dept: PEDIATRIC ALLERGY IMMUNOLOGY | Facility: CLINIC | Age: 57
End: 2022-10-31

## 2022-10-31 PROCEDURE — 95117 IMMUNOTHERAPY INJECTIONS: CPT

## 2022-11-07 ENCOUNTER — APPOINTMENT (OUTPATIENT)
Dept: PEDIATRIC ALLERGY IMMUNOLOGY | Facility: CLINIC | Age: 57
End: 2022-11-07

## 2022-11-07 PROCEDURE — 95117 IMMUNOTHERAPY INJECTIONS: CPT

## 2022-11-15 ENCOUNTER — APPOINTMENT (OUTPATIENT)
Dept: PEDIATRIC ALLERGY IMMUNOLOGY | Facility: CLINIC | Age: 57
End: 2022-11-15

## 2022-11-15 PROCEDURE — 95117 IMMUNOTHERAPY INJECTIONS: CPT

## 2022-11-21 ENCOUNTER — APPOINTMENT (OUTPATIENT)
Dept: PEDIATRIC ALLERGY IMMUNOLOGY | Facility: CLINIC | Age: 57
End: 2022-11-21

## 2022-11-21 PROCEDURE — 95117 IMMUNOTHERAPY INJECTIONS: CPT

## 2022-11-28 ENCOUNTER — APPOINTMENT (OUTPATIENT)
Dept: PEDIATRIC ALLERGY IMMUNOLOGY | Facility: CLINIC | Age: 57
End: 2022-11-28

## 2022-11-28 PROCEDURE — 95117 IMMUNOTHERAPY INJECTIONS: CPT

## 2022-12-05 ENCOUNTER — APPOINTMENT (OUTPATIENT)
Dept: PEDIATRIC ALLERGY IMMUNOLOGY | Facility: CLINIC | Age: 57
End: 2022-12-05

## 2022-12-05 PROCEDURE — 95117 IMMUNOTHERAPY INJECTIONS: CPT

## 2022-12-13 ENCOUNTER — APPOINTMENT (OUTPATIENT)
Dept: PEDIATRIC ALLERGY IMMUNOLOGY | Facility: CLINIC | Age: 57
End: 2022-12-13

## 2022-12-13 PROCEDURE — 95117 IMMUNOTHERAPY INJECTIONS: CPT

## 2022-12-19 ENCOUNTER — APPOINTMENT (OUTPATIENT)
Dept: PEDIATRIC ALLERGY IMMUNOLOGY | Facility: CLINIC | Age: 57
End: 2022-12-19

## 2022-12-19 PROCEDURE — 95117 IMMUNOTHERAPY INJECTIONS: CPT

## 2022-12-27 ENCOUNTER — APPOINTMENT (OUTPATIENT)
Dept: PEDIATRIC ALLERGY IMMUNOLOGY | Facility: CLINIC | Age: 57
End: 2022-12-27

## 2022-12-27 PROCEDURE — 95117 IMMUNOTHERAPY INJECTIONS: CPT

## 2023-01-03 ENCOUNTER — APPOINTMENT (OUTPATIENT)
Dept: PEDIATRIC ALLERGY IMMUNOLOGY | Facility: CLINIC | Age: 58
End: 2023-01-03
Payer: MEDICARE

## 2023-01-03 PROCEDURE — 95117 IMMUNOTHERAPY INJECTIONS: CPT

## 2023-01-06 ENCOUNTER — APPOINTMENT (OUTPATIENT)
Dept: PEDIATRIC ALLERGY IMMUNOLOGY | Facility: CLINIC | Age: 58
End: 2023-01-06
Payer: MEDICARE

## 2023-01-06 PROCEDURE — 95165 ANTIGEN THERAPY SERVICES: CPT

## 2023-01-09 ENCOUNTER — APPOINTMENT (OUTPATIENT)
Dept: PEDIATRIC ALLERGY IMMUNOLOGY | Facility: CLINIC | Age: 58
End: 2023-01-09
Payer: MEDICARE

## 2023-01-09 PROCEDURE — 95117 IMMUNOTHERAPY INJECTIONS: CPT

## 2023-01-17 ENCOUNTER — APPOINTMENT (OUTPATIENT)
Dept: PEDIATRIC ALLERGY IMMUNOLOGY | Facility: CLINIC | Age: 58
End: 2023-01-17
Payer: MEDICARE

## 2023-01-17 PROCEDURE — 95117 IMMUNOTHERAPY INJECTIONS: CPT

## 2023-02-06 ENCOUNTER — APPOINTMENT (OUTPATIENT)
Dept: PEDIATRIC ALLERGY IMMUNOLOGY | Facility: CLINIC | Age: 58
End: 2023-02-06
Payer: MEDICARE

## 2023-02-06 PROCEDURE — 95117 IMMUNOTHERAPY INJECTIONS: CPT

## 2023-02-13 ENCOUNTER — APPOINTMENT (OUTPATIENT)
Dept: PEDIATRIC ALLERGY IMMUNOLOGY | Facility: CLINIC | Age: 58
End: 2023-02-13
Payer: MEDICARE

## 2023-02-13 PROCEDURE — 95117 IMMUNOTHERAPY INJECTIONS: CPT

## 2023-02-21 ENCOUNTER — APPOINTMENT (OUTPATIENT)
Dept: PEDIATRIC ALLERGY IMMUNOLOGY | Facility: CLINIC | Age: 58
End: 2023-02-21
Payer: MEDICARE

## 2023-02-21 PROCEDURE — 95117 IMMUNOTHERAPY INJECTIONS: CPT

## 2023-03-02 ENCOUNTER — APPOINTMENT (OUTPATIENT)
Dept: PEDIATRIC ALLERGY IMMUNOLOGY | Facility: CLINIC | Age: 58
End: 2023-03-02
Payer: MEDICARE

## 2023-03-02 PROCEDURE — 95117 IMMUNOTHERAPY INJECTIONS: CPT

## 2023-03-06 ENCOUNTER — APPOINTMENT (OUTPATIENT)
Dept: PEDIATRIC ALLERGY IMMUNOLOGY | Facility: CLINIC | Age: 58
End: 2023-03-06
Payer: MEDICARE

## 2023-03-06 PROCEDURE — 95117 IMMUNOTHERAPY INJECTIONS: CPT

## 2023-03-14 ENCOUNTER — APPOINTMENT (OUTPATIENT)
Dept: PEDIATRIC ALLERGY IMMUNOLOGY | Facility: CLINIC | Age: 58
End: 2023-03-14
Payer: MEDICARE

## 2023-03-14 PROCEDURE — 95117 IMMUNOTHERAPY INJECTIONS: CPT

## 2023-03-17 ENCOUNTER — APPOINTMENT (OUTPATIENT)
Dept: PEDIATRIC ALLERGY IMMUNOLOGY | Facility: CLINIC | Age: 58
End: 2023-03-17
Payer: MEDICARE

## 2023-03-17 PROCEDURE — 95165 ANTIGEN THERAPY SERVICES: CPT

## 2023-03-20 ENCOUNTER — APPOINTMENT (OUTPATIENT)
Dept: PEDIATRIC ALLERGY IMMUNOLOGY | Facility: CLINIC | Age: 58
End: 2023-03-20
Payer: MEDICARE

## 2023-03-20 PROCEDURE — 95117 IMMUNOTHERAPY INJECTIONS: CPT

## 2023-03-27 ENCOUNTER — APPOINTMENT (OUTPATIENT)
Dept: PEDIATRIC ALLERGY IMMUNOLOGY | Facility: CLINIC | Age: 58
End: 2023-03-27
Payer: MEDICARE

## 2023-03-27 PROCEDURE — 95117 IMMUNOTHERAPY INJECTIONS: CPT

## 2023-04-04 ENCOUNTER — APPOINTMENT (OUTPATIENT)
Dept: PEDIATRIC ALLERGY IMMUNOLOGY | Facility: CLINIC | Age: 58
End: 2023-04-04
Payer: MEDICARE

## 2023-04-04 PROCEDURE — 95117 IMMUNOTHERAPY INJECTIONS: CPT

## 2023-04-11 ENCOUNTER — APPOINTMENT (OUTPATIENT)
Dept: PEDIATRIC ALLERGY IMMUNOLOGY | Facility: CLINIC | Age: 58
End: 2023-04-11
Payer: MEDICARE

## 2023-04-11 PROCEDURE — 95117 IMMUNOTHERAPY INJECTIONS: CPT

## 2023-04-17 ENCOUNTER — APPOINTMENT (OUTPATIENT)
Dept: PEDIATRIC ALLERGY IMMUNOLOGY | Facility: CLINIC | Age: 58
End: 2023-04-17
Payer: MEDICARE

## 2023-04-17 PROCEDURE — 95117 IMMUNOTHERAPY INJECTIONS: CPT

## 2023-04-24 ENCOUNTER — APPOINTMENT (OUTPATIENT)
Dept: PEDIATRIC ALLERGY IMMUNOLOGY | Facility: CLINIC | Age: 58
End: 2023-04-24
Payer: MEDICARE

## 2023-04-24 PROCEDURE — 95117 IMMUNOTHERAPY INJECTIONS: CPT

## 2023-05-04 ENCOUNTER — APPOINTMENT (OUTPATIENT)
Dept: PEDIATRIC ALLERGY IMMUNOLOGY | Facility: CLINIC | Age: 58
End: 2023-05-04
Payer: MEDICARE

## 2023-05-04 PROCEDURE — 95117 IMMUNOTHERAPY INJECTIONS: CPT

## 2023-05-09 ENCOUNTER — APPOINTMENT (OUTPATIENT)
Dept: PEDIATRIC ALLERGY IMMUNOLOGY | Facility: CLINIC | Age: 58
End: 2023-05-09
Payer: MEDICARE

## 2023-05-09 PROCEDURE — 95117 IMMUNOTHERAPY INJECTIONS: CPT

## 2023-05-18 ENCOUNTER — APPOINTMENT (OUTPATIENT)
Dept: PEDIATRIC ALLERGY IMMUNOLOGY | Facility: CLINIC | Age: 58
End: 2023-05-18
Payer: MEDICARE

## 2023-05-18 PROCEDURE — 95117 IMMUNOTHERAPY INJECTIONS: CPT

## 2023-05-25 ENCOUNTER — APPOINTMENT (OUTPATIENT)
Dept: PEDIATRIC ALLERGY IMMUNOLOGY | Facility: CLINIC | Age: 58
End: 2023-05-25
Payer: MEDICARE

## 2023-05-25 PROCEDURE — 95117 IMMUNOTHERAPY INJECTIONS: CPT

## 2023-05-25 RX ORDER — EPINEPHRINE 0.3 MG/.3ML
0.3 INJECTION INTRAMUSCULAR
Qty: 1 | Refills: 0 | Status: ACTIVE | COMMUNITY
Start: 2023-05-25 | End: 1900-01-01

## 2023-05-25 RX ORDER — AZELASTINE HYDROCHLORIDE 0.5 MG/ML
0.05 SOLUTION/ DROPS OPHTHALMIC TWICE DAILY
Qty: 3 | Refills: 0 | Status: ACTIVE | COMMUNITY
Start: 2023-05-25 | End: 1900-01-01

## 2023-05-30 ENCOUNTER — APPOINTMENT (OUTPATIENT)
Dept: PEDIATRIC ALLERGY IMMUNOLOGY | Facility: CLINIC | Age: 58
End: 2023-05-30
Payer: MEDICARE

## 2023-05-30 PROCEDURE — 95117 IMMUNOTHERAPY INJECTIONS: CPT

## 2023-06-05 ENCOUNTER — APPOINTMENT (OUTPATIENT)
Dept: PEDIATRIC ALLERGY IMMUNOLOGY | Facility: CLINIC | Age: 58
End: 2023-06-05
Payer: MEDICARE

## 2023-06-05 PROCEDURE — 95117 IMMUNOTHERAPY INJECTIONS: CPT

## 2023-06-13 ENCOUNTER — APPOINTMENT (OUTPATIENT)
Dept: PEDIATRIC ALLERGY IMMUNOLOGY | Facility: CLINIC | Age: 58
End: 2023-06-13
Payer: MEDICARE

## 2023-06-13 PROCEDURE — 95117 IMMUNOTHERAPY INJECTIONS: CPT

## 2023-06-20 ENCOUNTER — APPOINTMENT (OUTPATIENT)
Dept: PEDIATRIC ALLERGY IMMUNOLOGY | Facility: CLINIC | Age: 58
End: 2023-06-20
Payer: MEDICARE

## 2023-06-20 ENCOUNTER — NON-APPOINTMENT (OUTPATIENT)
Age: 58
End: 2023-06-20

## 2023-06-20 PROCEDURE — 95117 IMMUNOTHERAPY INJECTIONS: CPT

## 2023-06-26 ENCOUNTER — APPOINTMENT (OUTPATIENT)
Dept: PEDIATRIC ALLERGY IMMUNOLOGY | Facility: CLINIC | Age: 58
End: 2023-06-26
Payer: MEDICARE

## 2023-06-26 PROCEDURE — 95117 IMMUNOTHERAPY INJECTIONS: CPT

## 2023-07-10 ENCOUNTER — APPOINTMENT (OUTPATIENT)
Dept: PEDIATRIC ALLERGY IMMUNOLOGY | Facility: CLINIC | Age: 58
End: 2023-07-10
Payer: MEDICARE

## 2023-07-10 PROCEDURE — 95117 IMMUNOTHERAPY INJECTIONS: CPT

## 2023-07-17 ENCOUNTER — APPOINTMENT (OUTPATIENT)
Dept: PEDIATRIC ALLERGY IMMUNOLOGY | Facility: CLINIC | Age: 58
End: 2023-07-17
Payer: MEDICARE

## 2023-07-17 PROCEDURE — 95117 IMMUNOTHERAPY INJECTIONS: CPT

## 2023-07-31 ENCOUNTER — APPOINTMENT (OUTPATIENT)
Dept: PEDIATRIC ALLERGY IMMUNOLOGY | Facility: CLINIC | Age: 58
End: 2023-07-31
Payer: MEDICARE

## 2023-07-31 PROCEDURE — 95117 IMMUNOTHERAPY INJECTIONS: CPT

## 2023-08-08 ENCOUNTER — APPOINTMENT (OUTPATIENT)
Dept: PEDIATRIC ALLERGY IMMUNOLOGY | Facility: CLINIC | Age: 58
End: 2023-08-08
Payer: MEDICARE

## 2023-08-08 PROCEDURE — 95117 IMMUNOTHERAPY INJECTIONS: CPT

## 2023-08-15 ENCOUNTER — APPOINTMENT (OUTPATIENT)
Dept: PEDIATRIC ALLERGY IMMUNOLOGY | Facility: CLINIC | Age: 58
End: 2023-08-15
Payer: MEDICARE

## 2023-08-15 PROCEDURE — 95117 IMMUNOTHERAPY INJECTIONS: CPT

## 2023-08-22 ENCOUNTER — APPOINTMENT (OUTPATIENT)
Dept: PEDIATRIC ALLERGY IMMUNOLOGY | Facility: CLINIC | Age: 58
End: 2023-08-22
Payer: MEDICARE

## 2023-08-22 PROCEDURE — 95117 IMMUNOTHERAPY INJECTIONS: CPT

## 2023-08-28 ENCOUNTER — APPOINTMENT (OUTPATIENT)
Dept: PEDIATRIC ALLERGY IMMUNOLOGY | Facility: CLINIC | Age: 58
End: 2023-08-28
Payer: MEDICARE

## 2023-08-28 PROCEDURE — 95117 IMMUNOTHERAPY INJECTIONS: CPT

## 2023-09-07 ENCOUNTER — APPOINTMENT (OUTPATIENT)
Dept: PEDIATRIC ALLERGY IMMUNOLOGY | Facility: CLINIC | Age: 58
End: 2023-09-07
Payer: MEDICARE

## 2023-09-07 PROCEDURE — 95117 IMMUNOTHERAPY INJECTIONS: CPT

## 2023-09-12 ENCOUNTER — APPOINTMENT (OUTPATIENT)
Dept: PEDIATRIC ALLERGY IMMUNOLOGY | Facility: CLINIC | Age: 58
End: 2023-09-12
Payer: MEDICARE

## 2023-09-12 PROCEDURE — 95117 IMMUNOTHERAPY INJECTIONS: CPT

## 2023-09-21 ENCOUNTER — APPOINTMENT (OUTPATIENT)
Dept: PEDIATRIC ALLERGY IMMUNOLOGY | Facility: CLINIC | Age: 58
End: 2023-09-21
Payer: MEDICARE

## 2023-09-21 PROCEDURE — 95117 IMMUNOTHERAPY INJECTIONS: CPT

## 2023-09-28 ENCOUNTER — APPOINTMENT (OUTPATIENT)
Dept: PEDIATRIC ALLERGY IMMUNOLOGY | Facility: CLINIC | Age: 58
End: 2023-09-28
Payer: MEDICARE

## 2023-09-28 PROCEDURE — 95117 IMMUNOTHERAPY INJECTIONS: CPT

## 2023-10-10 ENCOUNTER — APPOINTMENT (OUTPATIENT)
Dept: PEDIATRIC ALLERGY IMMUNOLOGY | Facility: CLINIC | Age: 58
End: 2023-10-10
Payer: MEDICARE

## 2023-10-10 PROCEDURE — 95117 IMMUNOTHERAPY INJECTIONS: CPT

## 2023-10-16 ENCOUNTER — APPOINTMENT (OUTPATIENT)
Dept: PEDIATRIC ALLERGY IMMUNOLOGY | Facility: CLINIC | Age: 58
End: 2023-10-16
Payer: MEDICARE

## 2023-10-16 PROCEDURE — 95117 IMMUNOTHERAPY INJECTIONS: CPT

## 2023-10-20 ENCOUNTER — APPOINTMENT (OUTPATIENT)
Dept: PEDIATRIC ALLERGY IMMUNOLOGY | Facility: CLINIC | Age: 58
End: 2023-10-20
Payer: MEDICARE

## 2023-10-20 PROCEDURE — 95165 ANTIGEN THERAPY SERVICES: CPT

## 2023-10-23 ENCOUNTER — APPOINTMENT (OUTPATIENT)
Dept: PEDIATRIC ALLERGY IMMUNOLOGY | Facility: CLINIC | Age: 58
End: 2023-10-23
Payer: MEDICARE

## 2023-10-23 PROCEDURE — 95117 IMMUNOTHERAPY INJECTIONS: CPT

## 2023-10-31 ENCOUNTER — APPOINTMENT (OUTPATIENT)
Dept: PEDIATRIC ALLERGY IMMUNOLOGY | Facility: CLINIC | Age: 58
End: 2023-10-31
Payer: MEDICARE

## 2023-10-31 VITALS — BODY MASS INDEX: 31.71 KG/M2 | WEIGHT: 179 LBS | HEIGHT: 63 IN

## 2023-10-31 DIAGNOSIS — K21.9 GASTRO-ESOPHAGEAL REFLUX DISEASE W/OUT ESOPHAGITIS: ICD-10-CM

## 2023-10-31 DIAGNOSIS — J45.30 MILD PERSISTENT ASTHMA, UNCOMPLICATED: ICD-10-CM

## 2023-10-31 DIAGNOSIS — H10.13 ACUTE ATOPIC CONJUNCTIVITIS, BILATERAL: ICD-10-CM

## 2023-10-31 PROCEDURE — 99214 OFFICE O/P EST MOD 30 MIN: CPT

## 2023-11-13 ENCOUNTER — APPOINTMENT (OUTPATIENT)
Dept: PEDIATRIC ALLERGY IMMUNOLOGY | Facility: CLINIC | Age: 58
End: 2023-11-13
Payer: MEDICARE

## 2023-11-13 PROCEDURE — 95117 IMMUNOTHERAPY INJECTIONS: CPT

## 2023-11-28 ENCOUNTER — APPOINTMENT (OUTPATIENT)
Dept: PEDIATRIC ALLERGY IMMUNOLOGY | Facility: CLINIC | Age: 58
End: 2023-11-28
Payer: MEDICARE

## 2023-11-28 PROCEDURE — 95117 IMMUNOTHERAPY INJECTIONS: CPT

## 2023-12-12 ENCOUNTER — APPOINTMENT (OUTPATIENT)
Dept: PEDIATRIC ALLERGY IMMUNOLOGY | Facility: CLINIC | Age: 58
End: 2023-12-12
Payer: MEDICARE

## 2023-12-12 PROCEDURE — 95117 IMMUNOTHERAPY INJECTIONS: CPT

## 2024-01-02 ENCOUNTER — APPOINTMENT (OUTPATIENT)
Dept: PEDIATRIC ALLERGY IMMUNOLOGY | Facility: CLINIC | Age: 59
End: 2024-01-02
Payer: MEDICARE

## 2024-01-02 PROCEDURE — 95117 IMMUNOTHERAPY INJECTIONS: CPT

## 2024-01-25 ENCOUNTER — APPOINTMENT (OUTPATIENT)
Dept: PEDIATRIC ALLERGY IMMUNOLOGY | Facility: CLINIC | Age: 59
End: 2024-01-25
Payer: MEDICARE

## 2024-01-25 PROCEDURE — 95117 IMMUNOTHERAPY INJECTIONS: CPT

## 2024-02-27 ENCOUNTER — APPOINTMENT (OUTPATIENT)
Dept: PEDIATRIC ALLERGY IMMUNOLOGY | Facility: CLINIC | Age: 59
End: 2024-02-27
Payer: MEDICARE

## 2024-02-27 PROCEDURE — 95117 IMMUNOTHERAPY INJECTIONS: CPT

## 2024-03-08 ENCOUNTER — APPOINTMENT (OUTPATIENT)
Dept: PEDIATRIC ALLERGY IMMUNOLOGY | Facility: CLINIC | Age: 59
End: 2024-03-08
Payer: MEDICARE

## 2024-03-08 PROCEDURE — 95165 ANTIGEN THERAPY SERVICES: CPT

## 2024-03-12 ENCOUNTER — APPOINTMENT (OUTPATIENT)
Dept: PEDIATRIC ALLERGY IMMUNOLOGY | Facility: CLINIC | Age: 59
End: 2024-03-12
Payer: MEDICARE

## 2024-03-12 PROCEDURE — 95117 IMMUNOTHERAPY INJECTIONS: CPT

## 2024-04-02 ENCOUNTER — APPOINTMENT (OUTPATIENT)
Dept: PEDIATRIC ALLERGY IMMUNOLOGY | Facility: CLINIC | Age: 59
End: 2024-04-02
Payer: MEDICARE

## 2024-04-02 PROCEDURE — 95117 IMMUNOTHERAPY INJECTIONS: CPT

## 2024-05-10 ENCOUNTER — APPOINTMENT (OUTPATIENT)
Dept: PEDIATRIC ALLERGY IMMUNOLOGY | Facility: CLINIC | Age: 59
End: 2024-05-10

## 2024-05-14 ENCOUNTER — APPOINTMENT (OUTPATIENT)
Dept: PEDIATRIC ALLERGY IMMUNOLOGY | Facility: CLINIC | Age: 59
End: 2024-05-14
Payer: MEDICARE

## 2024-05-14 PROCEDURE — 95117 IMMUNOTHERAPY INJECTIONS: CPT

## 2024-06-04 ENCOUNTER — APPOINTMENT (OUTPATIENT)
Dept: PEDIATRIC ALLERGY IMMUNOLOGY | Facility: CLINIC | Age: 59
End: 2024-06-04
Payer: MEDICARE

## 2024-06-04 DIAGNOSIS — J30.1 ALLERGIC RHINITIS DUE TO POLLEN: ICD-10-CM

## 2024-06-04 PROCEDURE — 95117 IMMUNOTHERAPY INJECTIONS: CPT

## 2024-07-18 ENCOUNTER — APPOINTMENT (OUTPATIENT)
Dept: PEDIATRIC ALLERGY IMMUNOLOGY | Facility: CLINIC | Age: 59
End: 2024-07-18
Payer: MEDICARE

## 2024-07-18 DIAGNOSIS — J30.1 ALLERGIC RHINITIS DUE TO POLLEN: ICD-10-CM

## 2024-07-18 PROCEDURE — 95117 IMMUNOTHERAPY INJECTIONS: CPT

## 2024-09-27 ENCOUNTER — APPOINTMENT (OUTPATIENT)
Dept: PEDIATRIC ALLERGY IMMUNOLOGY | Facility: CLINIC | Age: 59
End: 2024-09-27

## 2024-09-27 DIAGNOSIS — J30.1 ALLERGIC RHINITIS DUE TO POLLEN: ICD-10-CM

## 2024-09-27 PROCEDURE — 95165 ANTIGEN THERAPY SERVICES: CPT

## 2024-10-10 ENCOUNTER — APPOINTMENT (OUTPATIENT)
Dept: PEDIATRIC ALLERGY IMMUNOLOGY | Facility: CLINIC | Age: 59
End: 2024-10-10
Payer: MEDICARE

## 2024-10-10 PROCEDURE — 95117 IMMUNOTHERAPY INJECTIONS: CPT

## 2024-10-17 ENCOUNTER — APPOINTMENT (OUTPATIENT)
Dept: PEDIATRIC ALLERGY IMMUNOLOGY | Facility: CLINIC | Age: 59
End: 2024-10-17
Payer: MEDICARE

## 2024-10-17 VITALS
WEIGHT: 185 LBS | DIASTOLIC BLOOD PRESSURE: 90 MMHG | SYSTOLIC BLOOD PRESSURE: 140 MMHG | HEIGHT: 63 IN | BODY MASS INDEX: 32.78 KG/M2

## 2024-10-17 DIAGNOSIS — J45.30 MILD PERSISTENT ASTHMA, UNCOMPLICATED: ICD-10-CM

## 2024-10-17 DIAGNOSIS — H10.13 ACUTE ATOPIC CONJUNCTIVITIS, BILATERAL: ICD-10-CM

## 2024-10-17 DIAGNOSIS — J30.1 ALLERGIC RHINITIS DUE TO POLLEN: ICD-10-CM

## 2024-10-17 DIAGNOSIS — K21.9 GASTRO-ESOPHAGEAL REFLUX DISEASE W/OUT ESOPHAGITIS: ICD-10-CM

## 2024-10-17 PROCEDURE — 99213 OFFICE O/P EST LOW 20 MIN: CPT

## 2025-02-28 ENCOUNTER — APPOINTMENT (OUTPATIENT)
Dept: PEDIATRIC ALLERGY IMMUNOLOGY | Facility: CLINIC | Age: 60
End: 2025-02-28

## 2025-03-20 ENCOUNTER — APPOINTMENT (OUTPATIENT)
Dept: PEDIATRIC ALLERGY IMMUNOLOGY | Facility: CLINIC | Age: 60
End: 2025-03-20
Payer: MEDICARE

## 2025-03-20 VITALS
HEIGHT: 63 IN | DIASTOLIC BLOOD PRESSURE: 90 MMHG | BODY MASS INDEX: 29.95 KG/M2 | SYSTOLIC BLOOD PRESSURE: 145 MMHG | WEIGHT: 169 LBS

## 2025-03-20 DIAGNOSIS — J45.30 MILD PERSISTENT ASTHMA, UNCOMPLICATED: ICD-10-CM

## 2025-03-20 DIAGNOSIS — H10.13 ACUTE ATOPIC CONJUNCTIVITIS, BILATERAL: ICD-10-CM

## 2025-03-20 DIAGNOSIS — R21 RASH AND OTHER NONSPECIFIC SKIN ERUPTION: ICD-10-CM

## 2025-03-20 DIAGNOSIS — J30.1 ALLERGIC RHINITIS DUE TO POLLEN: ICD-10-CM

## 2025-03-20 PROCEDURE — 99213 OFFICE O/P EST LOW 20 MIN: CPT

## 2025-03-20 RX ORDER — AZELASTINE HYDROCHLORIDE 137 UG/1
0.1 SPRAY, METERED NASAL
Qty: 30 | Refills: 2 | Status: ACTIVE | COMMUNITY
Start: 2025-03-20 | End: 1900-01-01

## 2025-03-20 RX ORDER — ALBUTEROL SULFATE 90 UG/1
108 (90 BASE) INHALANT RESPIRATORY (INHALATION)
Qty: 1 | Refills: 0 | Status: ACTIVE | COMMUNITY
Start: 2025-03-20 | End: 1900-01-01